# Patient Record
Sex: MALE | Race: WHITE | Employment: FULL TIME | URBAN - METROPOLITAN AREA
[De-identification: names, ages, dates, MRNs, and addresses within clinical notes are randomized per-mention and may not be internally consistent; named-entity substitution may affect disease eponyms.]

---

## 2021-08-18 ENCOUNTER — HOSPITAL ENCOUNTER (EMERGENCY)
Facility: HOSPITAL | Age: 51
Discharge: HOME | End: 2021-08-19
Attending: EMERGENCY MEDICINE
Payer: COMMERCIAL

## 2021-08-18 DIAGNOSIS — S61.411A LACERATION OF RIGHT HAND WITHOUT FOREIGN BODY, INITIAL ENCOUNTER: Primary | ICD-10-CM

## 2021-08-18 PROCEDURE — 99283 EMERGENCY DEPT VISIT LOW MDM: CPT

## 2021-08-18 PROCEDURE — 12001 RPR S/N/AX/GEN/TRNK 2.5CM/<: CPT

## 2021-08-19 VITALS
RESPIRATION RATE: 20 BRPM | TEMPERATURE: 97 F | WEIGHT: 160 LBS | SYSTOLIC BLOOD PRESSURE: 130 MMHG | BODY MASS INDEX: 22.9 KG/M2 | HEART RATE: 54 BPM | DIASTOLIC BLOOD PRESSURE: 82 MMHG | OXYGEN SATURATION: 98 % | HEIGHT: 70 IN

## 2021-08-19 NOTE — ED INITIAL ASSESSMENT (HPI)
Pt presents to ED for a lac between the pinky and index finger to the hand on glass when doing dishes. Pt is UTD on tetanus.

## 2021-08-19 NOTE — ED PROVIDER NOTES
Patient Seen in: [facility] AND [facility] Emergency Department      History   Patient presents with:  Laceration/Abrasion    Stated Complaint: r hand lac    HPI/Subjective:   HPI  41-year-old male with history of thyroid disease, type 1 diabetes, here with comp None (Room air)       Current:/72   Pulse 66   Temp 97 °F (36.1 °C) (Temporal)   Resp 18   Ht 177.8 cm (5' 10\")   Wt 72.6 kg   SpO2 97%   BMI 22.96 kg/m²         Physical Exam  Vitals and nursing note reviewed. HENT:      Head: Normocephalic. Sterile dressing applied. Patient instructed to follow up with their PMD or return to the ED for suture removal in 7-10 days. DIAGNOSTICS:   Labs:  No results found for this or any previous visit (from the past 24 hour(s)).     Imaging Results Avail Disposition and Plan     Clinical Impression:  Laceration of right hand without foreign body, initial encounter  (primary encounter diagnosis)     Disposition:  Discharge  8/18/2021 11:50 pm    Follow-up:  DO Pal Zuñiga [facility]